# Patient Record
Sex: MALE | Race: WHITE | NOT HISPANIC OR LATINO | Employment: FULL TIME | URBAN - METROPOLITAN AREA
[De-identification: names, ages, dates, MRNs, and addresses within clinical notes are randomized per-mention and may not be internally consistent; named-entity substitution may affect disease eponyms.]

---

## 2023-12-11 ENCOUNTER — APPOINTMENT (OUTPATIENT)
Dept: ENDOCRINOLOGY | Facility: CLINIC | Age: 28
End: 2023-12-11
Payer: COMMERCIAL

## 2024-01-15 ENCOUNTER — OFFICE VISIT (OUTPATIENT)
Dept: ENDOCRINOLOGY | Facility: CLINIC | Age: 29
End: 2024-01-15
Payer: COMMERCIAL

## 2024-01-15 VITALS
BODY MASS INDEX: 20.61 KG/M2 | HEART RATE: 87 BPM | DIASTOLIC BLOOD PRESSURE: 64 MMHG | WEIGHT: 152 LBS | SYSTOLIC BLOOD PRESSURE: 108 MMHG

## 2024-01-15 DIAGNOSIS — E10.65 TYPE 1 DIABETES MELLITUS WITH HYPERGLYCEMIA (MULTI): Primary | ICD-10-CM

## 2024-01-15 LAB — POC HEMOGLOBIN A1C: 9.1 % (ref 4.2–6.5)

## 2024-01-15 PROCEDURE — 4010F ACE/ARB THERAPY RXD/TAKEN: CPT | Performed by: INTERNAL MEDICINE

## 2024-01-15 PROCEDURE — 1036F TOBACCO NON-USER: CPT | Performed by: INTERNAL MEDICINE

## 2024-01-15 PROCEDURE — 3074F SYST BP LT 130 MM HG: CPT | Performed by: INTERNAL MEDICINE

## 2024-01-15 PROCEDURE — 3078F DIAST BP <80 MM HG: CPT | Performed by: INTERNAL MEDICINE

## 2024-01-15 PROCEDURE — 99214 OFFICE O/P EST MOD 30 MIN: CPT | Performed by: INTERNAL MEDICINE

## 2024-01-15 PROCEDURE — 83036 HEMOGLOBIN GLYCOSYLATED A1C: CPT | Performed by: INTERNAL MEDICINE

## 2024-01-15 RX ORDER — LISINOPRIL 2.5 MG/1
2.5 TABLET ORAL DAILY
COMMUNITY
End: 2024-05-06 | Stop reason: SDUPTHER

## 2024-01-15 RX ORDER — INSULIN LISPRO 100 [IU]/ML
INJECTION, SOLUTION INTRAVENOUS; SUBCUTANEOUS
COMMUNITY
Start: 2015-06-08 | End: 2024-05-06 | Stop reason: SDUPTHER

## 2024-01-15 RX ORDER — INSULIN GLARGINE 100 [IU]/ML
INJECTION, SOLUTION SUBCUTANEOUS
COMMUNITY
Start: 2022-08-12 | End: 2024-05-06 | Stop reason: SDUPTHER

## 2024-01-15 NOTE — PROGRESS NOTES
19History Of Present Illness  Calos Dozier is a 28 y.o. male     Duration of type 1 diabetes mellitus:  19 years  Complications:  none    Less active at work  [employer]    Lantus 25 units QAM  Humalog 1 unit per 12 gm carb, add 1 unit for every 100 mg/dl     FreeStyle Cydney 2  Patient is testing glucose 288 times daily  Records reviewed, on file    Low Cydney readings overnight which he thinks are false, correlate to 80s on fingerstick       Past Medical History  He has a past medical history of Other conditions influencing health status.    Surgical History  He has no past surgical history on file.     Social History  He has no history on file for tobacco use, alcohol use, and drug use.    Family History  No family history on file.    Medications  Current Outpatient Medications   Medication Instructions    insulin glargine (Lantus) 100 unit/mL (3 mL) pen INJECT 26 UNIT Daily under the skin    insulin lispro (HumaLOG) 100 unit/mL injection Sliding scale    lisinopril 2.5 mg, oral, Daily       Allergies  Patient has no known allergies.    Review of Systems   Constitutional:  Negative for unexpected weight change.   Eyes:  Negative for visual disturbance.   Respiratory:  Negative for shortness of breath.    Gastrointestinal:  Negative for diarrhea, nausea and vomiting.   Endocrine:        As above         Last Recorded Vitals  Blood pressure 108/64, pulse 87, weight 68.9 kg (152 lb).    Physical Exam  Constitutional:       General: He is not in acute distress.  HENT:      Head: Normocephalic.   Eyes:      Extraocular Movements: Extraocular movements intact.   Neck:      Thyroid: No thyromegaly.   Cardiovascular:      Pulses:           Radial pulses are 2+ on the right side and 2+ on the left side.   Musculoskeletal:      Right lower leg: No edema.      Left lower leg: No edema.   Neurological:      Mental Status: He is alert.      Motor: No tremor.   Psychiatric:         Mood and Affect: Affect normal.               IMPRESSION  TYPE 1 DIABETES MELLITUS WITH HYPERGLYCEMIA  Rapid A1c 9.1%  Increasing hyperglycemia  Decreased physical activity      RECOMMENDATIONS  Humalog 1 unit per 10 gm carb    Follow up 3 months  Draw blood, urine albumin before next appointment

## 2024-01-15 NOTE — PATIENT INSTRUCTIONS
A1c 9.1%    RECOMMENDATIONS  Humalog 1 unit per 10 gm carb    Follow up 3 months  Draw blood, urine albumin before next appointment

## 2024-01-21 ASSESSMENT — ENCOUNTER SYMPTOMS
VOMITING: 0
NAUSEA: 0
SHORTNESS OF BREATH: 0
DIARRHEA: 0
ENDOCRINE COMMENTS: AS ABOVE
UNEXPECTED WEIGHT CHANGE: 0

## 2024-05-06 ENCOUNTER — OFFICE VISIT (OUTPATIENT)
Dept: ENDOCRINOLOGY | Facility: CLINIC | Age: 29
End: 2024-05-06

## 2024-05-06 VITALS
HEART RATE: 85 BPM | WEIGHT: 152 LBS | DIASTOLIC BLOOD PRESSURE: 69 MMHG | SYSTOLIC BLOOD PRESSURE: 111 MMHG | BODY MASS INDEX: 20.61 KG/M2

## 2024-05-06 DIAGNOSIS — E10.65 TYPE 1 DIABETES MELLITUS WITH HYPERGLYCEMIA (MULTI): Primary | ICD-10-CM

## 2024-05-06 LAB — POC HEMOGLOBIN A1C: 7.5 % (ref 4.2–6.5)

## 2024-05-06 PROCEDURE — 99214 OFFICE O/P EST MOD 30 MIN: CPT | Performed by: INTERNAL MEDICINE

## 2024-05-06 PROCEDURE — 3078F DIAST BP <80 MM HG: CPT | Performed by: INTERNAL MEDICINE

## 2024-05-06 PROCEDURE — 4010F ACE/ARB THERAPY RXD/TAKEN: CPT | Performed by: INTERNAL MEDICINE

## 2024-05-06 PROCEDURE — 3074F SYST BP LT 130 MM HG: CPT | Performed by: INTERNAL MEDICINE

## 2024-05-06 PROCEDURE — 83036 HEMOGLOBIN GLYCOSYLATED A1C: CPT | Performed by: INTERNAL MEDICINE

## 2024-05-06 PROCEDURE — 1036F TOBACCO NON-USER: CPT | Performed by: INTERNAL MEDICINE

## 2024-05-06 RX ORDER — INSULIN LISPRO 100 [IU]/ML
5-10 INJECTION, SOLUTION INTRAVENOUS; SUBCUTANEOUS
Qty: 30 ML | Refills: 3 | Status: SHIPPED | OUTPATIENT
Start: 2024-05-06

## 2024-05-06 RX ORDER — PEN NEEDLE, DIABETIC 31 GX5/16"
NEEDLE, DISPOSABLE MISCELLANEOUS
Qty: 400 EACH | Refills: 3 | Status: SHIPPED | OUTPATIENT
Start: 2024-05-06

## 2024-05-06 RX ORDER — INSULIN GLARGINE 100 [IU]/ML
INJECTION, SOLUTION SUBCUTANEOUS
Qty: 30 ML | Refills: 3 | Status: SHIPPED | OUTPATIENT
Start: 2024-05-06

## 2024-05-06 RX ORDER — FLASH GLUCOSE SENSOR
KIT MISCELLANEOUS
Qty: 6 EACH | Refills: 3 | Status: SHIPPED | OUTPATIENT
Start: 2024-05-06

## 2024-05-06 RX ORDER — LISINOPRIL 2.5 MG/1
2.5 TABLET ORAL DAILY
Qty: 90 TABLET | Refills: 3 | Status: SHIPPED | OUTPATIENT
Start: 2024-05-06

## 2024-05-06 ASSESSMENT — ENCOUNTER SYMPTOMS
NAUSEA: 0
APPETITE CHANGE: 0
FREQUENCY: 0
DIARRHEA: 0
HEADACHES: 0
CONSTIPATION: 0
COUGH: 0
ABDOMINAL PAIN: 0
POLYDIPSIA: 0
VOMITING: 0
FEVER: 0
SHORTNESS OF BREATH: 0

## 2024-05-06 NOTE — PATIENT INSTRUCTIONS
A1c 7.5%    RECOMMENDATIONS  Continue current dosing  Test fingerstick glucose when you have low readings overnight  If hypoglycemia is real, decrease Lantus to 24 units    Draw labs  Results available on [employer]  Call/message if you have not received results in 3 days.     Follow up 3-4 months  A1c at next appointment

## 2024-05-06 NOTE — PROGRESS NOTES
History Of Present Illness  Calos Dozier is a 29 y.o. male     Duration of type 1 diabetes mellitus:  19 years  Complications:  none     Lantus 256units QAM  Humalog 1 unit per 12 gm carb, add 1 unit for every 100 mg/dl      FreeStyle Cydney 2  Patient is testing glucose 288 times daily  Records reviewed, on file     Last eye exam:  March 2024    Past Medical History  He has a past medical history of Other conditions influencing health status.    Surgical History  He has no past surgical history on file.     Social History  He reports that he has never smoked. He has never used smokeless tobacco. No history on file for alcohol use and drug use.    Family History  No family history on file.    Medications  Current Outpatient Medications   Medication Instructions    insulin glargine (Lantus) 100 unit/mL (3 mL) pen INJECT 26 UNIT Daily under the skin    insulin lispro (HumaLOG) 100 unit/mL injection Sliding scale    lisinopril 2.5 mg, oral, Daily       Allergies  Patient has no known allergies.    Review of Systems   Constitutional:  Negative for appetite change and fever.   HENT:          Denies dry mouth   Eyes:  Negative for visual disturbance.   Respiratory:  Negative for cough and shortness of breath.    Cardiovascular:  Negative for chest pain.   Gastrointestinal:  Negative for abdominal pain, constipation, diarrhea, nausea and vomiting.   Endocrine: Negative for polydipsia and polyuria.   Genitourinary:  Negative for frequency.   Neurological:  Negative for headaches.         Last Recorded Vitals  Blood pressure 111/69, pulse 85, weight 68.9 kg (152 lb).    Physical Exam  Constitutional:       General: He is not in acute distress.  HENT:      Head: Normocephalic.      Mouth/Throat:      Mouth: Mucous membranes are moist.   Eyes:      Extraocular Movements: Extraocular movements intact.   Neck:      Thyroid: No thyromegaly.   Cardiovascular:      Pulses:           Radial pulses are 2+ on the right side and 2+  on the left side.   Musculoskeletal:      Right lower leg: No edema.      Left lower leg: No edema.   Lymphadenopathy:      Cervical: No cervical adenopathy.   Neurological:      Mental Status: He is alert.      Motor: No tremor.   Psychiatric:         Mood and Affect: Affect normal.          Relevant Results  Glucose (mg/dL)   Date Value   02/14/2022 206 (H)   03/24/2021 365 (H)   09/24/2018 296 (H)     POC HEMOGLOBIN A1c (%)   Date Value   01/15/2024 9.1 (A)     Hemoglobin A1C (%)   Date Value   07/01/2019 9.8   01/28/2019 9.7   09/24/2018 8.3     Bicarbonate (mmol/L)   Date Value   02/14/2022 24   03/24/2021 28   09/24/2018 29     Urea Nitrogen (mg/dL)   Date Value   02/14/2022 20   03/24/2021 10   09/24/2018 11     Creatinine (mg/dL)   Date Value   02/14/2022 0.80   03/24/2021 0.83   09/24/2018 0.75     Lab Results   Component Value Date    CHOL 183 02/14/2022    CHOL 164 03/24/2021    CHOL 156 09/24/2018     Lab Results   Component Value Date    HDL 58.2 02/14/2022    HDL 50.0 03/24/2021    HDL 48.4 09/24/2018     Lab Results   Component Value Date    TRIG 166 (H) 02/14/2022    TRIG 172 (H) 03/24/2021    TRIG 217 (H) 09/24/2018     Lab Results   Component Value Date    TSH 1.70 02/14/2022       IMPRESSION  TYPE 1 DIABETES MELLITUS  Rapid A1c 7.5%  A1c much improved  Patient states hypoglycemic Cydney readings overnight are false due to compression of the sensor  He intentionally runs higher during the day to avoid hypoglycemia at work.   Patient considering intermediate fasting on the recommendation of a colleague      RECOMMENDATIONS  Continue current dosing  Test fingerstick glucose when you have low readings overnight  If hypoglycemia is real, decrease Lantus to 24 units    Advised he may try intermittent fasting but that technique is more typical for weight loss, which he does not desire, than glucose control.  Advised I do not advocate for prolonged fasting in type 1 diabetes.    Draw labs  Results  available on [employer]  Call/message if you have not received results in 3 days.     Follow up 3-4 months  A1c at next appointment

## 2024-05-23 ENCOUNTER — TELEMEDICINE (OUTPATIENT)
Dept: ENDOCRINOLOGY | Facility: CLINIC | Age: 29
End: 2024-05-23
Payer: COMMERCIAL

## 2024-05-23 DIAGNOSIS — E10.65 TYPE 1 DIABETES MELLITUS WITH HYPERGLYCEMIA (MULTI): Primary | ICD-10-CM

## 2024-05-23 PROCEDURE — 99213 OFFICE O/P EST LOW 20 MIN: CPT | Performed by: INTERNAL MEDICINE

## 2024-05-23 PROCEDURE — 4010F ACE/ARB THERAPY RXD/TAKEN: CPT | Performed by: INTERNAL MEDICINE

## 2024-05-23 NOTE — PROGRESS NOTES
"History Of Present Illness  Calos Dozier is a 29 y.o. male     Duration of type 1 diabetes mellitus:  19 years  Complications:  Retinopathy    Sudden vision clouding.  Urgent ophthalmology visit at [facility] 5/15/24, indicated right vitreous hemorrhage.  Injection performed.  Vision currently improving.   He has proliferative retinopathy in the left eye as well.   Planning pan retinal LASER of left eye and Avastin injection to right eye.    Noted he had a dilated exam at [facility] 2 months ago who did not diagnosis retinopathy.      Lantus 25 units QAM  Humalog 1 unit per 12 gm carb, add 1 unit for every 100 mg/dl      FreeStyle Cydney 2  Patient is testing glucose 288 times daily  Records reviewed, on file       Past Medical History  He has a past medical history of Other conditions influencing health status.    Surgical History  He has no past surgical history on file.     Social History  He reports that he has never smoked. He has never used smokeless tobacco. No history on file for alcohol use and drug use.    Family History  No family history on file.    Medications  Current Outpatient Medications   Medication Instructions    BD Ultra-Fine Short Pen Needle 31 gauge x 5/16\" needle Four times daily    FreeStyle Cydney 14 Day Sensor kit For continuous glucose monitoring.  Change every 14 days.    insulin glargine (Lantus) 100 unit/mL (3 mL) pen INJECT 26 UNIT Daily under the skin    insulin lispro (HUMALOG) 5-10 Units, subcutaneous, 3 times daily (morning, midday, late afternoon), Take as directed per insulin instructions.    lisinopril 2.5 mg, oral, Daily       Allergies  Patient has no known allergies.      Last Recorded Vitals  There were no vitals taken for this visit.    Physical Exam  Constitutional:       General: He is not in acute distress.  Eyes:      Extraocular Movements: Extraocular movements intact.   Neurological:      Mental Status: He is alert.   Psychiatric:         Mood and Affect: Affect normal. "          IMPRESSION  TYPE 1 DIABETES MELLITUS  Complicated by proliferative retinopathy, previously unknown  Right vitreous hemorrhage, improving vision  Off work since 5/15/24 due to lack of vision.         RECOMMENDATIONS  Continue current insulin program    Plan return to work 5/28/24     [facility] paperwork for employer ([employer])    Follow up 3 months as already planned

## 2024-07-31 ENCOUNTER — TELEPHONE (OUTPATIENT)
Dept: ENDOCRINOLOGY | Facility: CLINIC | Age: 29
End: 2024-07-31
Payer: COMMERCIAL

## 2024-07-31 DIAGNOSIS — E10.65 TYPE 1 DIABETES MELLITUS WITH HYPERGLYCEMIA (MULTI): Primary | ICD-10-CM

## 2024-07-31 RX ORDER — INSULIN LISPRO 100 [IU]/ML
5-10 INJECTION, SOLUTION INTRAVENOUS; SUBCUTANEOUS
Qty: 30 ML | Refills: 3 | Status: SHIPPED | OUTPATIENT
Start: 2024-07-31

## 2024-07-31 NOTE — TELEPHONE ENCOUNTER
Pt called and is requesting Humalog pens be sent to [facility]. Shared he has one left and that when he requested refills was provided vials vs the pens and need pens sent in.

## 2024-08-05 DIAGNOSIS — E10.65 TYPE 1 DIABETES MELLITUS WITH HYPERGLYCEMIA (MULTI): ICD-10-CM

## 2024-08-05 RX ORDER — INSULIN LISPRO 100 [IU]/ML
5-10 INJECTION, SOLUTION INTRAVENOUS; SUBCUTANEOUS
Qty: 30 ML | Refills: 3 | Status: SHIPPED | OUTPATIENT
Start: 2024-08-05

## 2024-08-19 ENCOUNTER — APPOINTMENT (OUTPATIENT)
Dept: ENDOCRINOLOGY | Facility: CLINIC | Age: 29
End: 2024-08-19

## 2024-08-19 VITALS
HEART RATE: 82 BPM | DIASTOLIC BLOOD PRESSURE: 78 MMHG | SYSTOLIC BLOOD PRESSURE: 120 MMHG | WEIGHT: 145 LBS | BODY MASS INDEX: 19.67 KG/M2

## 2024-08-19 DIAGNOSIS — E10.65 TYPE 1 DIABETES MELLITUS WITH HYPERGLYCEMIA (MULTI): Primary | ICD-10-CM

## 2024-08-19 PROCEDURE — 4010F ACE/ARB THERAPY RXD/TAKEN: CPT | Performed by: INTERNAL MEDICINE

## 2024-08-19 PROCEDURE — 99214 OFFICE O/P EST MOD 30 MIN: CPT | Performed by: INTERNAL MEDICINE

## 2024-08-19 PROCEDURE — 3074F SYST BP LT 130 MM HG: CPT | Performed by: INTERNAL MEDICINE

## 2024-08-19 PROCEDURE — 3078F DIAST BP <80 MM HG: CPT | Performed by: INTERNAL MEDICINE

## 2024-08-19 RX ORDER — BLOOD-GLUCOSE SENSOR
EACH MISCELLANEOUS
Qty: 2 EACH | Refills: 11 | Status: SHIPPED | OUTPATIENT
Start: 2024-08-19

## 2024-08-19 NOTE — PATIENT INSTRUCTIONS
RECOMMENDATIONS  Consider change to DexCom G7    Humalog 1 unit per 10 grams carb, add 1 unit per 50 over glucose 101 mg/dl.      Consider OmniPod 5    Follow up 2 months  A1c at next appointment

## 2024-08-19 NOTE — LETTER
"August 20, 2024     Cyn Ayers DO  [address]  [address]  [facility] [id number]    Patient: Calos Dozier   YOB: 1995   Date of Visit: 8/19/2024       Dear Dr. Cyn Ayers DO:    Thank you for referring Calos Dozier to me for evaluation. Below are my notes for this consultation.  If you have questions, please do not hesitate to call me. I look forward to following your patient along with you.       Sincerely,     Carrington Foreman MD      CC: No Recipients  ______________________________________________________________________________________    History Of Present Illness  Calos Dozier is a 29 y.o. male     Duration of type 1 diabetes mellitus:  19 years  Complications:  Retinopathy     Sudden vision clouding.  Urgent ophthalmology visit at [facility] 5/15/24, indicated right vitreous hemorrhage.    He has proliferative retinopathy in the left eye as well.   LASER of left eye and Avastin injection to right eye.   Returns every 2 weeks.       Lantus 26 units QAM  Humalog 1 unit per 12 gm carb, add 1 unit for every 100 mg/dl      FreeStyle Cydney 2  Patient is testing glucose 288 times daily  Records reviewed, on file    Past Medical History  He has a past medical history of Other conditions influencing health status.    Surgical History  He has no past surgical history on file.     Social History  He reports that he has never smoked. He has never used smokeless tobacco. No history on file for alcohol use and drug use.    Family History  No family history on file.    Medications  Current Outpatient Medications   Medication Instructions   • BD Ultra-Fine Short Pen Needle 31 gauge x 5/16\" needle Four times daily   • FreeStyle Cydney 14 Day Sensor kit For continuous glucose monitoring.  Change every 14 days.   • HumaLOG KwikPen Insulin 5-10 Units, subcutaneous, 3 times daily before meals, Take as directed per insulin instructions.   • insulin glargine (Lantus) 100 unit/mL (3 mL) pen INJECT 26 UNIT " Daily under the skin   • lisinopril 2.5 mg, oral, Daily       Allergies  Patient has no known allergies.    Review of Systems   Constitutional:  Negative for appetite change and fever.   HENT:  Negative for sore throat.         Denies dry mouth   Eyes:  Positive for visual disturbance.   Respiratory:  Negative for cough and shortness of breath.    Cardiovascular:  Negative for chest pain.   Gastrointestinal:  Negative for abdominal pain, constipation, diarrhea, nausea and vomiting.   Endocrine: Negative for polydipsia and polyuria.   Genitourinary:  Negative for frequency.   Musculoskeletal:  Negative for arthralgias.   Skin:  Negative for rash.   Neurological:  Negative for headaches.   Psychiatric/Behavioral:  The patient is not nervous/anxious.          Last Recorded Vitals  Blood pressure 120/78, pulse 82, weight 65.8 kg (145 lb).    Physical Exam  Constitutional:       General: He is not in acute distress.  HENT:      Head: Normocephalic.      Mouth/Throat:      Mouth: Mucous membranes are moist.   Eyes:      Extraocular Movements: Extraocular movements intact.   Neck:      Thyroid: No thyroid mass or thyromegaly.   Cardiovascular:      Pulses:           Radial pulses are 2+ on the right side and 2+ on the left side.   Musculoskeletal:      Right lower leg: No edema.      Left lower leg: No edema.   Lymphadenopathy:      Cervical: No cervical adenopathy.   Neurological:      Mental Status: He is alert.      Motor: No tremor.   Psychiatric:         Mood and Affect: Affect normal.          Relevant Results  Glucose (mg/dL)   Date Value   02/14/2022 206 (H)   03/24/2021 365 (H)   09/24/2018 296 (H)     POC HEMOGLOBIN A1c (%)   Date Value   05/06/2024 7.5 (A)   01/15/2024 9.1 (A)     Hemoglobin A1C (%)   Date Value   07/01/2019 9.8   01/28/2019 9.7   09/24/2018 8.3     Bicarbonate (mmol/L)   Date Value   02/14/2022 24   03/24/2021 28   09/24/2018 29     Urea Nitrogen (mg/dL)   Date Value   02/14/2022 20    03/24/2021 10   09/24/2018 11     Creatinine (mg/dL)   Date Value   02/14/2022 0.80   03/24/2021 0.83   09/24/2018 0.75     Lab Results   Component Value Date    CHOL 183 02/14/2022    CHOL 164 03/24/2021    CHOL 156 09/24/2018     Lab Results   Component Value Date    HDL 58.2 02/14/2022    HDL 50.0 03/24/2021    HDL 48.4 09/24/2018     Lab Results   Component Value Date    TRIG 166 (H) 02/14/2022    TRIG 172 (H) 03/24/2021    TRIG 217 (H) 09/24/2018     Lab Results   Component Value Date    TSH 1.70 02/14/2022         IMPRESSION  TYPE 1 DIABETES MELLITUS  Complicated by proliferative retinopathy  False hypoglycemia and alarms on Cydney  No current A1c.  Patient prefers to defer to next visit.       RECOMMENDATIONS  Trial of Cydney 3  Consider change to DexCom G7    Humalog 1 unit per 10 grams carb, add 1 unit per 50 over glucose 101 mg/dl.      Consider OmniPod 5    Follow up 2 months  A1c at next appointment

## 2024-08-19 NOTE — PROGRESS NOTES
"History Of Present Illness  Calos Dozier is a 29 y.o. male     Duration of type 1 diabetes mellitus:  19 years  Complications:  Retinopathy     Sudden vision clouding.  Urgent ophthalmology visit at [facility] 5/15/24, indicated right vitreous hemorrhage.    He has proliferative retinopathy in the left eye as well.   LASER of left eye and Avastin injection to right eye.   Returns every 2 weeks.       Lantus 26 units QAM  Humalog 1 unit per 12 gm carb, add 1 unit for every 100 mg/dl      FreeStyle Cydney 2  Patient is testing glucose 288 times daily  Records reviewed, on file    Past Medical History  He has a past medical history of Other conditions influencing health status.    Surgical History  He has no past surgical history on file.     Social History  He reports that he has never smoked. He has never used smokeless tobacco. No history on file for alcohol use and drug use.    Family History  No family history on file.    Medications  Current Outpatient Medications   Medication Instructions    BD Ultra-Fine Short Pen Needle 31 gauge x 5/16\" needle Four times daily    FreeStyle Cydney 14 Day Sensor kit For continuous glucose monitoring.  Change every 14 days.    HumaLOG KwikPen Insulin 5-10 Units, subcutaneous, 3 times daily before meals, Take as directed per insulin instructions.    insulin glargine (Lantus) 100 unit/mL (3 mL) pen INJECT 26 UNIT Daily under the skin    lisinopril 2.5 mg, oral, Daily       Allergies  Patient has no known allergies.    Review of Systems   Constitutional:  Negative for appetite change and fever.   HENT:  Negative for sore throat.         Denies dry mouth   Eyes:  Positive for visual disturbance.   Respiratory:  Negative for cough and shortness of breath.    Cardiovascular:  Negative for chest pain.   Gastrointestinal:  Negative for abdominal pain, constipation, diarrhea, nausea and vomiting.   Endocrine: Negative for polydipsia and polyuria.   Genitourinary:  Negative for frequency. "   Musculoskeletal:  Negative for arthralgias.   Skin:  Negative for rash.   Neurological:  Negative for headaches.   Psychiatric/Behavioral:  The patient is not nervous/anxious.          Last Recorded Vitals  Blood pressure 120/78, pulse 82, weight 65.8 kg (145 lb).    Physical Exam  Constitutional:       General: He is not in acute distress.  HENT:      Head: Normocephalic.      Mouth/Throat:      Mouth: Mucous membranes are moist.   Eyes:      Extraocular Movements: Extraocular movements intact.   Neck:      Thyroid: No thyroid mass or thyromegaly.   Cardiovascular:      Pulses:           Radial pulses are 2+ on the right side and 2+ on the left side.   Musculoskeletal:      Right lower leg: No edema.      Left lower leg: No edema.   Lymphadenopathy:      Cervical: No cervical adenopathy.   Neurological:      Mental Status: He is alert.      Motor: No tremor.   Psychiatric:         Mood and Affect: Affect normal.          Relevant Results  Glucose (mg/dL)   Date Value   02/14/2022 206 (H)   03/24/2021 365 (H)   09/24/2018 296 (H)     POC HEMOGLOBIN A1c (%)   Date Value   05/06/2024 7.5 (A)   01/15/2024 9.1 (A)     Hemoglobin A1C (%)   Date Value   07/01/2019 9.8   01/28/2019 9.7   09/24/2018 8.3     Bicarbonate (mmol/L)   Date Value   02/14/2022 24   03/24/2021 28   09/24/2018 29     Urea Nitrogen (mg/dL)   Date Value   02/14/2022 20   03/24/2021 10   09/24/2018 11     Creatinine (mg/dL)   Date Value   02/14/2022 0.80   03/24/2021 0.83   09/24/2018 0.75     Lab Results   Component Value Date    CHOL 183 02/14/2022    CHOL 164 03/24/2021    CHOL 156 09/24/2018     Lab Results   Component Value Date    HDL 58.2 02/14/2022    HDL 50.0 03/24/2021    HDL 48.4 09/24/2018     Lab Results   Component Value Date    TRIG 166 (H) 02/14/2022    TRIG 172 (H) 03/24/2021    TRIG 217 (H) 09/24/2018     Lab Results   Component Value Date    TSH 1.70 02/14/2022         IMPRESSION  TYPE 1 DIABETES MELLITUS  Complicated by  proliferative retinopathy  False hypoglycemia and alarms on Cydney  No current A1c.  Patient prefers to defer to next visit.       RECOMMENDATIONS  Trial of Cydney 3  Consider change to DexCom G7    Humalog 1 unit per 10 grams carb, add 1 unit per 50 over glucose 101 mg/dl.      Consider OmniPod 5    Follow up 2 months  A1c at next appointment

## 2024-08-20 ASSESSMENT — ENCOUNTER SYMPTOMS
NERVOUS/ANXIOUS: 0
POLYDIPSIA: 0
SORE THROAT: 0
DIARRHEA: 0
VOMITING: 0
COUGH: 0
APPETITE CHANGE: 0
ABDOMINAL PAIN: 0
SHORTNESS OF BREATH: 0
HEADACHES: 0
NAUSEA: 0
FREQUENCY: 0
FEVER: 0
CONSTIPATION: 0
ARTHRALGIAS: 0

## 2024-10-21 ENCOUNTER — APPOINTMENT (OUTPATIENT)
Dept: ENDOCRINOLOGY | Facility: CLINIC | Age: 29
End: 2024-10-21

## 2025-03-17 ENCOUNTER — APPOINTMENT (OUTPATIENT)
Dept: ENDOCRINOLOGY | Facility: CLINIC | Age: 30
End: 2025-03-17
Payer: COMMERCIAL

## 2025-03-17 VITALS
BODY MASS INDEX: 20.7 KG/M2 | SYSTOLIC BLOOD PRESSURE: 130 MMHG | DIASTOLIC BLOOD PRESSURE: 86 MMHG | HEART RATE: 79 BPM | WEIGHT: 152.6 LBS

## 2025-03-17 DIAGNOSIS — E10.65 TYPE 1 DIABETES MELLITUS WITH HYPERGLYCEMIA (MULTI): ICD-10-CM

## 2025-03-17 LAB — POC HEMOGLOBIN A1C: 8.4 % (ref 4.2–6.5)

## 2025-03-17 PROCEDURE — 3079F DIAST BP 80-89 MM HG: CPT | Performed by: INTERNAL MEDICINE

## 2025-03-17 PROCEDURE — 1036F TOBACCO NON-USER: CPT | Performed by: INTERNAL MEDICINE

## 2025-03-17 PROCEDURE — 3075F SYST BP GE 130 - 139MM HG: CPT | Performed by: INTERNAL MEDICINE

## 2025-03-17 PROCEDURE — 4010F ACE/ARB THERAPY RXD/TAKEN: CPT | Performed by: INTERNAL MEDICINE

## 2025-03-17 PROCEDURE — 83036 HEMOGLOBIN GLYCOSYLATED A1C: CPT | Performed by: INTERNAL MEDICINE

## 2025-03-17 PROCEDURE — 99214 OFFICE O/P EST MOD 30 MIN: CPT | Performed by: INTERNAL MEDICINE

## 2025-03-17 ASSESSMENT — ENCOUNTER SYMPTOMS
NERVOUS/ANXIOUS: 0
SHORTNESS OF BREATH: 0
DIARRHEA: 0
CONSTIPATION: 0
VOMITING: 0
POLYDIPSIA: 0
ARTHRALGIAS: 0
SORE THROAT: 0
NAUSEA: 0
COUGH: 0
FEVER: 0
FREQUENCY: 0
HEADACHES: 0
APPETITE CHANGE: 0
ABDOMINAL PAIN: 0

## 2025-03-17 NOTE — PATIENT INSTRUCTIONS
Rapid A1c 8.4%    RECOMMENDATIONS  Draw labs, urine albumin  Results available on [employer]  Call/message if you have not received results in 3 days.       Humalog 1 unit per 8 gm carb, add 1 unit for every 50 over glucose 101 mg/dl     Follow up 3 months    Discussed options of DexCom, OmniPod 5

## 2025-03-17 NOTE — PROGRESS NOTES
"History Of Present Illness  Calos Dozier is a 30 y.o. male     Duration of type 1 diabetes mellitus:  20 years  Complications:  Retinopathy     Bilateral vitrectomies 3 months ago  Visual acuity improved on last exam 3/8/25    Lantus 27 units QAM  Humalog 1 unit per 10 gm carb, add 1 unit for every 50 over glucose 101 mg/dl      FreeStyle Cydney 3  Patient is testing glucose 1440 times daily  Records reviewed, on file  Frequent sensor failures at 3-4 days  He is having falsely low glucose readings       Past Medical History  He has a past medical history of Other conditions influencing health status.    Surgical History  He has no past surgical history on file.     Social History  He reports that he has never smoked. He has never used smokeless tobacco. No history on file for alcohol use and drug use.    Family History  No family history on file.    Medications  Current Outpatient Medications   Medication Instructions    BD Ultra-Fine Short Pen Needle 31 gauge x 5/16\" needle Four times daily    FreeStyle Cydney 3 Sensor device For continuous glucose monitoring.  Change every 14 days.    HumaLOG KwikPen Insulin 5-10 Units, subcutaneous, 3 times daily before meals, Take as directed per insulin instructions.    insulin glargine (Lantus) 100 unit/mL (3 mL) pen INJECT 26 UNIT Daily under the skin    lisinopril 2.5 mg, oral, Daily       Allergies  Patient has no known allergies.    Review of Systems   Constitutional:  Negative for appetite change and fever.   HENT:  Negative for sore throat.         Denies dry mouth   Eyes:  Negative for visual disturbance.   Respiratory:  Negative for cough and shortness of breath.    Cardiovascular:  Negative for chest pain.   Gastrointestinal:  Negative for abdominal pain, constipation, diarrhea, nausea and vomiting.   Endocrine: Negative for polydipsia and polyuria.   Genitourinary:  Negative for frequency.   Musculoskeletal:  Negative for arthralgias.   Skin:  Negative for rash. "   Neurological:  Negative for headaches.   Psychiatric/Behavioral:  The patient is not nervous/anxious.          Last Recorded Vitals  Blood pressure 130/86, pulse 79, weight 69.2 kg (152 lb 9.6 oz).    Physical Exam  Constitutional:       General: He is not in acute distress.  HENT:      Head: Normocephalic.      Mouth/Throat:      Mouth: Mucous membranes are moist.   Eyes:      Extraocular Movements: Extraocular movements intact.   Neck:      Thyroid: No thyroid mass or thyromegaly.   Cardiovascular:      Pulses:           Radial pulses are 2+ on the right side and 2+ on the left side.   Musculoskeletal:      Right lower leg: No edema.      Left lower leg: No edema.   Lymphadenopathy:      Cervical: No cervical adenopathy.   Neurological:      Mental Status: He is alert.      Motor: No tremor.   Psychiatric:         Mood and Affect: Affect normal.          Relevant Results  Glucose (mg/dL)   Date Value   02/14/2022 206 (H)   03/24/2021 365 (H)   09/24/2018 296 (H)     POC HEMOGLOBIN A1c (%)   Date Value   03/17/2025 8.4 (A)   05/06/2024 7.5 (A)   01/15/2024 9.1 (A)     Bicarbonate (mmol/L)   Date Value   02/14/2022 24   03/24/2021 28   09/24/2018 29     Urea Nitrogen (mg/dL)   Date Value   02/14/2022 20   03/24/2021 10   09/24/2018 11     Creatinine (mg/dL)   Date Value   02/14/2022 0.80   03/24/2021 0.83   09/24/2018 0.75     Lab Results   Component Value Date    CHOL 183 02/14/2022    CHOL 164 03/24/2021    CHOL 156 09/24/2018     Lab Results   Component Value Date    HDL 58.2 02/14/2022    HDL 50.0 03/24/2021    HDL 48.4 09/24/2018     Lab Results   Component Value Date    TRIG 166 (H) 02/14/2022    TRIG 172 (H) 03/24/2021    TRIG 217 (H) 09/24/2018     Lab Results   Component Value Date    TSH 1.70 02/14/2022         IMPRESSION  TYPE 1 DIABETES MELLITUS WITH HYPERGLYCEMIA  Complicated by retinopathy  Rapid A1c 8.4%  False hypoglycemia on Cydney CGM  Postprandial hyperglycemia      RECOMMENDATIONS  Draw labs,  urine albumin  Results available on [employer]  Call/message if you have not received results in 3 days.     Humalog 1 unit per 8 gm carb, add 1 unit for every 50 over glucose 101 mg/dl     Follow up 3 months    Discussed options of DexCom, OmniPod 5

## 2025-03-17 NOTE — LETTER
"March 17, 2025     Cyn Ayers DO  [address]  [address]  [facility] [address]    Patient: Calos Dozier   YOB: 1995   Date of Visit: 3/17/2025       Dear Dr. Cyn Ayers DO:    Thank you for referring Calos Dozier to me for evaluation. Below are my notes for this consultation.  If you have questions, please do not hesitate to call me. I look forward to following your patient along with you.       Sincerely,     Carrington Foreman MD      CC: No Recipients  ______________________________________________________________________________________    History Of Present Illness  Calos Dozier is a 30 y.o. male     Duration of type 1 diabetes mellitus:  20 years  Complications:  Retinopathy     Bilateral vitrectomies 3 months ago  Visual acuity improved on last exam 3/8/25    Lantus 27 units QAM  Humalog 1 unit per 10 gm carb, add 1 unit for every 50 over glucose 101 mg/dl      FreeStyle Cydney 3  Patient is testing glucose 1440 times daily  Records reviewed, on file  Frequent sensor failures at 3-4 days  He is having falsely low glucose readings       Past Medical History  He has a past medical history of Other conditions influencing health status.    Surgical History  He has no past surgical history on file.     Social History  He reports that he has never smoked. He has never used smokeless tobacco. No history on file for alcohol use and drug use.    Family History  No family history on file.    Medications  Current Outpatient Medications   Medication Instructions   • BD Ultra-Fine Short Pen Needle 31 gauge x 5/16\" needle Four times daily   • FreeStyle Cydney 3 Sensor device For continuous glucose monitoring.  Change every 14 days.   • HumaLOG KwikPen Insulin 5-10 Units, subcutaneous, 3 times daily before meals, Take as directed per insulin instructions.   • insulin glargine (Lantus) 100 unit/mL (3 mL) pen INJECT 26 UNIT Daily under the skin   • lisinopril 2.5 mg, oral, Daily "       Allergies  Patient has no known allergies.    Review of Systems   Constitutional:  Negative for appetite change and fever.   HENT:  Negative for sore throat.         Denies dry mouth   Eyes:  Negative for visual disturbance.   Respiratory:  Negative for cough and shortness of breath.    Cardiovascular:  Negative for chest pain.   Gastrointestinal:  Negative for abdominal pain, constipation, diarrhea, nausea and vomiting.   Endocrine: Negative for polydipsia and polyuria.   Genitourinary:  Negative for frequency.   Musculoskeletal:  Negative for arthralgias.   Skin:  Negative for rash.   Neurological:  Negative for headaches.   Psychiatric/Behavioral:  The patient is not nervous/anxious.          Last Recorded Vitals  Blood pressure 130/86, pulse 79, weight 69.2 kg (152 lb 9.6 oz).    Physical Exam  Constitutional:       General: He is not in acute distress.  HENT:      Head: Normocephalic.      Mouth/Throat:      Mouth: Mucous membranes are moist.   Eyes:      Extraocular Movements: Extraocular movements intact.   Neck:      Thyroid: No thyroid mass or thyromegaly.   Cardiovascular:      Pulses:           Radial pulses are 2+ on the right side and 2+ on the left side.   Musculoskeletal:      Right lower leg: No edema.      Left lower leg: No edema.   Lymphadenopathy:      Cervical: No cervical adenopathy.   Neurological:      Mental Status: He is alert.      Motor: No tremor.   Psychiatric:         Mood and Affect: Affect normal.          Relevant Results  Glucose (mg/dL)   Date Value   02/14/2022 206 (H)   03/24/2021 365 (H)   09/24/2018 296 (H)     POC HEMOGLOBIN A1c (%)   Date Value   03/17/2025 8.4 (A)   05/06/2024 7.5 (A)   01/15/2024 9.1 (A)     Bicarbonate (mmol/L)   Date Value   02/14/2022 24   03/24/2021 28   09/24/2018 29     Urea Nitrogen (mg/dL)   Date Value   02/14/2022 20   03/24/2021 10   09/24/2018 11     Creatinine (mg/dL)   Date Value   02/14/2022 0.80   03/24/2021 0.83   09/24/2018 0.75      Lab Results   Component Value Date    CHOL 183 02/14/2022    CHOL 164 03/24/2021    CHOL 156 09/24/2018     Lab Results   Component Value Date    HDL 58.2 02/14/2022    HDL 50.0 03/24/2021    HDL 48.4 09/24/2018     Lab Results   Component Value Date    TRIG 166 (H) 02/14/2022    TRIG 172 (H) 03/24/2021    TRIG 217 (H) 09/24/2018     Lab Results   Component Value Date    TSH 1.70 02/14/2022         IMPRESSION  TYPE 1 DIABETES MELLITUS WITH HYPERGLYCEMIA  Complicated by retinopathy  Rapid A1c 8.4%  False hypoglycemia on Cydney CGM  Postprandial hyperglycemia      RECOMMENDATIONS  Draw labs, urine albumin  Results available on [employer]  Call/message if you have not received results in 3 days.     Humalog 1 unit per 8 gm carb, add 1 unit for every 50 over glucose 101 mg/dl     Follow up 3 months    Discussed options of DexCom, OmniPod 5

## 2025-03-27 DIAGNOSIS — E10.65 TYPE 1 DIABETES MELLITUS WITH HYPERGLYCEMIA (MULTI): ICD-10-CM

## 2025-03-27 RX ORDER — INSULIN LISPRO 100 [IU]/ML
5-10 INJECTION, SOLUTION INTRAVENOUS; SUBCUTANEOUS
Qty: 30 ML | Refills: 3 | Status: SHIPPED | OUTPATIENT
Start: 2025-03-27

## 2025-03-27 NOTE — TELEPHONE ENCOUNTER
Requested Medication: Humalog     [facility]     Last Filled: 8/5/24  Last office visit: 3/17/25  Next follow up:  7/14/25

## 2025-05-08 DIAGNOSIS — E10.65 TYPE 1 DIABETES MELLITUS WITH HYPERGLYCEMIA (MULTI): ICD-10-CM

## 2025-05-11 RX ORDER — INSULIN GLARGINE 100 [IU]/ML
INJECTION, SOLUTION SUBCUTANEOUS
Qty: 30 ML | Refills: 3 | Status: SHIPPED | OUTPATIENT
Start: 2025-05-11

## 2025-06-14 DIAGNOSIS — E10.65 TYPE 1 DIABETES MELLITUS WITH HYPERGLYCEMIA (MULTI): ICD-10-CM

## 2025-06-17 RX ORDER — LISINOPRIL 2.5 MG/1
2.5 TABLET ORAL DAILY
Qty: 90 TABLET | Refills: 3 | Status: SHIPPED | OUTPATIENT
Start: 2025-06-17

## 2025-06-24 DIAGNOSIS — E10.65 TYPE 1 DIABETES MELLITUS WITH HYPERGLYCEMIA (MULTI): ICD-10-CM

## 2025-06-29 RX ORDER — PEN NEEDLE, DIABETIC 31 GX5/16"
NEEDLE, DISPOSABLE MISCELLANEOUS
Qty: 400 EACH | Refills: 3 | Status: SHIPPED | OUTPATIENT
Start: 2025-06-29

## 2025-07-14 ENCOUNTER — APPOINTMENT (OUTPATIENT)
Dept: ENDOCRINOLOGY | Facility: CLINIC | Age: 30
End: 2025-07-14
Payer: COMMERCIAL

## 2025-07-30 DIAGNOSIS — E10.65 TYPE 1 DIABETES MELLITUS WITH HYPERGLYCEMIA (MULTI): Primary | ICD-10-CM

## 2025-07-30 RX ORDER — BLOOD-GLUCOSE SENSOR
EACH MISCELLANEOUS
Qty: 6 EACH | Refills: 3 | Status: SHIPPED | OUTPATIENT
Start: 2025-07-30

## 2025-07-30 RX ORDER — BLOOD-GLUCOSE SENSOR
EACH MISCELLANEOUS
COMMUNITY
End: 2025-07-30 | Stop reason: SDUPTHER

## 2025-07-30 NOTE — TELEPHONE ENCOUNTER
Requested Medication: Cydney 3 Plus     [facility]: [facility]     Last Filled: 8/19/24  Last office visit: 3/17/25  Next follow up:  10/27/25

## 2025-10-27 ENCOUNTER — APPOINTMENT (OUTPATIENT)
Dept: ENDOCRINOLOGY | Facility: CLINIC | Age: 30
End: 2025-10-27
Payer: COMMERCIAL